# Patient Record
Sex: MALE | URBAN - METROPOLITAN AREA
[De-identification: names, ages, dates, MRNs, and addresses within clinical notes are randomized per-mention and may not be internally consistent; named-entity substitution may affect disease eponyms.]

---

## 2020-07-28 ENCOUNTER — OFFICE VISIT (OUTPATIENT)
Dept: URBAN - METROPOLITAN AREA CLINIC 76 | Facility: CLINIC | Age: 70
End: 2020-07-28
Payer: COMMERCIAL

## 2020-07-28 DIAGNOSIS — H52.4 PRESBYOPIA: Primary | ICD-10-CM

## 2020-07-28 DIAGNOSIS — H25.13 AGE-RELATED NUCLEAR CATARACT, BILATERAL: ICD-10-CM

## 2020-07-28 DIAGNOSIS — H02.421 MYOGENIC PTOSIS OF RIGHT EYELID: ICD-10-CM

## 2020-07-28 PROCEDURE — 92012 INTRM OPH EXAM EST PATIENT: CPT | Performed by: OPTOMETRIST

## 2020-07-28 ASSESSMENT — INTRAOCULAR PRESSURE
OD: 14
OS: 13

## 2020-07-28 ASSESSMENT — KERATOMETRY
OD: 41.38
OS: 40.63

## 2020-07-28 ASSESSMENT — VISUAL ACUITY
OD: 20/25
OS: 20/20

## 2020-07-28 NOTE — IMPRESSION/PLAN
Impression: Presbyopia: H52.4. pt declined dilation due to bad experience before Plan: A glasses prescription has been discussed and generated. Patient to call with any concerns. Explained in detail the importance of dilation.

## 2020-07-28 NOTE — IMPRESSION/PLAN
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 s/p ptosis repair. Hx of injury and diplopia (when look up and to left) Plan: No Tx: No treatment needed at this time. Will continue to monitor.

## 2021-03-03 DIAGNOSIS — Z23 NEED FOR VACCINATION: ICD-10-CM
